# Patient Record
Sex: MALE | Race: WHITE | Employment: OTHER | URBAN - METROPOLITAN AREA
[De-identification: names, ages, dates, MRNs, and addresses within clinical notes are randomized per-mention and may not be internally consistent; named-entity substitution may affect disease eponyms.]

---

## 2017-11-20 ENCOUNTER — OFFICE VISIT (OUTPATIENT)
Dept: FAMILY MEDICINE CLINIC | Facility: CLINIC | Age: 52
End: 2017-11-20

## 2017-11-20 VITALS
WEIGHT: 251 LBS | HEART RATE: 84 BPM | RESPIRATION RATE: 16 BRPM | HEIGHT: 67 IN | BODY MASS INDEX: 39.39 KG/M2 | TEMPERATURE: 98 F | SYSTOLIC BLOOD PRESSURE: 160 MMHG | DIASTOLIC BLOOD PRESSURE: 100 MMHG | OXYGEN SATURATION: 96 %

## 2017-11-20 DIAGNOSIS — M79.89 INFLAMMATION OF SOFT TISSUE: ICD-10-CM

## 2017-11-20 DIAGNOSIS — I10 ESSENTIAL HYPERTENSION: ICD-10-CM

## 2017-11-20 DIAGNOSIS — M25.421 ELBOW SWELLING, RIGHT: Primary | ICD-10-CM

## 2017-11-20 PROCEDURE — 99202 OFFICE O/P NEW SF 15 MIN: CPT | Performed by: NURSE PRACTITIONER

## 2017-11-20 RX ORDER — CEPHALEXIN 500 MG/1
500 CAPSULE ORAL 4 TIMES DAILY
Qty: 40 CAPSULE | Refills: 0 | Status: SHIPPED | OUTPATIENT
Start: 2017-11-20 | End: 2017-11-30

## 2017-11-21 NOTE — PATIENT INSTRUCTIONS
Return tomorrow for recheck. Follow up with a primary care doctor for blood pressure management    Discharge Instructions for Cellulitis  You have been diagnosed with cellulitis. This is an infection in the deepest layer of the skin.  In some cases, the · Redness that gets worse in or around the infected area, particularly if the area of redness expands to a wider area  · Shaking chills  · Swelling of the infected area  · Vomiting   Date Last Reviewed: 8/1/2016  © [id number] [employer].  [address] [facility]

## 2017-11-21 NOTE — PROGRESS NOTES
CHIEF COMPLAINT:   Patient presents with:  Derm Problem: possible spider bite on right elbow      HPI:     Cecy Cutler is a 47year old male who presents with girlfriend for concerns of possible infected spider bite on right elbow.  Patient first notice MUSC/SKEL: no joint swelling, no joint stiffness. CARDIOVASCULAR: denies chest pain on exertion or rest.  GI: no nausea, no vomiting or abdominal pain  NEURO: no abnormal sensation, no tingling of the skin or numbness.     EXAM:   /100   Pulse 84   T Recommended further eval at [facility] or ER. Pt refused d/t being uninsured. Will treat for possible cellulitis given findings and small break in skin. Will start antibiotic and pt agrees to return to [facility] tomorrow for recheck.   Agrees to go to ER for any new o · Keep the infected area clean. · When possible, raise the infected area above the level of your heart. This helps keep swelling down. · Talk with your healthcare provider if you are in pain.  Ask what kind of over-the-counter medicine you can take for pa

## 2018-02-23 ENCOUNTER — OFFICE VISIT (OUTPATIENT)
Dept: FAMILY MEDICINE CLINIC | Facility: CLINIC | Age: 53
End: 2018-02-23

## 2018-02-23 VITALS
DIASTOLIC BLOOD PRESSURE: 104 MMHG | HEART RATE: 98 BPM | OXYGEN SATURATION: 97 % | RESPIRATION RATE: 18 BRPM | SYSTOLIC BLOOD PRESSURE: 172 MMHG | TEMPERATURE: 99 F

## 2018-02-23 DIAGNOSIS — I10 ESSENTIAL HYPERTENSION: ICD-10-CM

## 2018-02-23 DIAGNOSIS — L03.213 PRESEPTAL CELLULITIS OF RIGHT EYE: Primary | ICD-10-CM

## 2018-02-23 PROCEDURE — 99213 OFFICE O/P EST LOW 20 MIN: CPT | Performed by: PHYSICIAN ASSISTANT

## 2018-02-23 RX ORDER — AMLODIPINE BESYLATE 5 MG/1
5 TABLET ORAL DAILY
Qty: 30 TABLET | Refills: 0 | Status: SHIPPED | OUTPATIENT
Start: 2018-02-23

## 2018-02-23 RX ORDER — LISINOPRIL 20 MG/1
20 TABLET ORAL DAILY
Qty: 30 TABLET | Refills: 0 | Status: SHIPPED | OUTPATIENT
Start: 2018-02-23

## 2018-02-23 RX ORDER — CLINDAMYCIN HYDROCHLORIDE 300 MG/1
300 CAPSULE ORAL 3 TIMES DAILY
Qty: 30 CAPSULE | Refills: 0 | Status: SHIPPED | OUTPATIENT
Start: 2018-02-23 | End: 2018-03-05

## 2018-02-24 NOTE — PROGRESS NOTES
CHIEF COMPLAINT:   Patient presents with:  Eye Problem: swelling around right eye, no eye pain      HPI:   Sam Vences is a 47year old male who presents with chief complaint of right periorbital irritation.  Symptoms began \"late last night\" Symptoms EYES PERRLA, EOMI, visual fields full to confrontation. right conjunctiva slightly erythematous and injected, no discharge. There is moderate edema of lower right blephar and slight edema of upper blephar.   Erythema in preorbital region extends from 2cm b BP significantly elevated in clinic today. He had been on lisinopril and amlodipine in the past with good control. Dr. Bennet Dakins refilling BP medications today.   Precautions provided on medications and how body will adjust to medications  Advised mo Call your healthcare provider right away if any of these occur:  · Increasing swelling or pain around the eye  · Increasing redness  · Changes in vision  · Fever of 100.4 (38º C) oral or 101.5 (38.6º C) rectal for more than 2 days on antibiotics  Date Last

## 2024-04-22 ENCOUNTER — HOSPITAL ENCOUNTER (INPATIENT)
Facility: HOSPITAL | Age: 59
LOS: 2 days | Discharge: HOME | End: 2024-04-24
Attending: EMERGENCY MEDICINE | Admitting: INTERNAL MEDICINE

## 2024-04-22 ENCOUNTER — APPOINTMENT (OUTPATIENT)
Dept: GENERAL RADIOLOGY | Facility: HOSPITAL | Age: 59
End: 2024-04-22
Attending: EMERGENCY MEDICINE

## 2024-04-22 DIAGNOSIS — M25.521 ELBOW PAIN, RIGHT: ICD-10-CM

## 2024-04-22 DIAGNOSIS — M00.9 INFECTION OF RIGHT ELBOW (HCC): Primary | ICD-10-CM

## 2024-04-22 DIAGNOSIS — I10 ESSENTIAL HYPERTENSION: ICD-10-CM

## 2024-04-22 PROBLEM — I16.0 HYPERTENSIVE URGENCY: Status: ACTIVE | Noted: 2024-04-22

## 2024-04-22 LAB
ALBUMIN SERPL-MCNC: 3.4 G/DL (ref 3.4–5)
ALBUMIN/GLOB SERPL: 0.9 {RATIO} (ref 1–2)
ALP LIVER SERPL-CCNC: 94 U/L
ALT SERPL-CCNC: 24 U/L
ANION GAP SERPL CALC-SCNC: 3 MMOL/L (ref 0–18)
AST SERPL-CCNC: 12 U/L (ref 15–37)
BASOPHILS # BLD AUTO: 0.05 X10(3) UL (ref 0–0.2)
BASOPHILS NFR BLD AUTO: 0.6 %
BILIRUB SERPL-MCNC: 0.4 MG/DL (ref 0.1–2)
BUN BLD-MCNC: 15 MG/DL (ref 9–23)
CALCIUM BLD-MCNC: 9.3 MG/DL (ref 8.5–10.1)
CHLORIDE SERPL-SCNC: 106 MMOL/L (ref 98–112)
CO2 SERPL-SCNC: 32 MMOL/L (ref 21–32)
CREAT BLD-MCNC: 0.94 MG/DL
CRP SERPL-MCNC: 3.74 MG/DL (ref ?–0.3)
EGFRCR SERPLBLD CKD-EPI 2021: 93 ML/MIN/1.73M2 (ref 60–?)
EOSINOPHIL # BLD AUTO: 0.18 X10(3) UL (ref 0–0.7)
EOSINOPHIL NFR BLD AUTO: 2.1 %
ERYTHROCYTE [DISTWIDTH] IN BLOOD BY AUTOMATED COUNT: 13.4 %
ERYTHROCYTE [SEDIMENTATION RATE] IN BLOOD: 60 MM/HR
GLOBULIN PLAS-MCNC: 4 G/DL (ref 2.8–4.4)
GLUCOSE BLD-MCNC: 138 MG/DL (ref 70–99)
HCT VFR BLD AUTO: 42.8 %
HGB BLD-MCNC: 13.8 G/DL
IMM GRANULOCYTES # BLD AUTO: 0.05 X10(3) UL (ref 0–1)
IMM GRANULOCYTES NFR BLD: 0.6 %
LACTATE SERPL-SCNC: 1.1 MMOL/L (ref 0.4–2)
LYMPHOCYTES # BLD AUTO: 1.83 X10(3) UL (ref 1–4)
LYMPHOCYTES NFR BLD AUTO: 21.1 %
MCH RBC QN AUTO: 29.5 PG (ref 26–34)
MCHC RBC AUTO-ENTMCNC: 32.2 G/DL (ref 31–37)
MCV RBC AUTO: 91.5 FL
MONOCYTES # BLD AUTO: 0.68 X10(3) UL (ref 0.1–1)
MONOCYTES NFR BLD AUTO: 7.8 %
NEUTROPHILS # BLD AUTO: 5.89 X10 (3) UL (ref 1.5–7.7)
NEUTROPHILS # BLD AUTO: 5.89 X10(3) UL (ref 1.5–7.7)
NEUTROPHILS NFR BLD AUTO: 67.8 %
OSMOLALITY SERPL CALC.SUM OF ELEC: 295 MOSM/KG (ref 275–295)
PLATELET # BLD AUTO: 232 10(3)UL (ref 150–450)
POTASSIUM SERPL-SCNC: 4.4 MMOL/L (ref 3.5–5.1)
PROT SERPL-MCNC: 7.4 G/DL (ref 6.4–8.2)
RBC # BLD AUTO: 4.68 X10(6)UL
SODIUM SERPL-SCNC: 141 MMOL/L (ref 136–145)
WBC # BLD AUTO: 8.7 X10(3) UL (ref 4–11)

## 2024-04-22 PROCEDURE — 99223 1ST HOSP IP/OBS HIGH 75: CPT | Performed by: INTERNAL MEDICINE

## 2024-04-22 PROCEDURE — 73080 X-RAY EXAM OF ELBOW: CPT | Performed by: EMERGENCY MEDICINE

## 2024-04-22 PROCEDURE — 0R9L3ZX DRAINAGE OF RIGHT ELBOW JOINT, PERCUTANEOUS APPROACH, DIAGNOSTIC: ICD-10-PCS | Performed by: RADIOLOGY

## 2024-04-22 RX ORDER — ENOXAPARIN SODIUM 100 MG/ML
40 INJECTION SUBCUTANEOUS NIGHTLY
Status: DISCONTINUED | OUTPATIENT
Start: 2024-04-23 | End: 2024-04-24

## 2024-04-22 RX ORDER — KETOROLAC TROMETHAMINE 30 MG/ML
15 INJECTION, SOLUTION INTRAMUSCULAR; INTRAVENOUS EVERY 6 HOURS PRN
Status: DISCONTINUED | OUTPATIENT
Start: 2024-04-22 | End: 2024-04-24

## 2024-04-22 RX ORDER — SENNOSIDES 8.6 MG
17.2 TABLET ORAL NIGHTLY PRN
Status: DISCONTINUED | OUTPATIENT
Start: 2024-04-22 | End: 2024-04-24

## 2024-04-22 RX ORDER — LISINOPRIL 40 MG/1
40 TABLET ORAL DAILY
Status: DISCONTINUED | OUTPATIENT
Start: 2024-04-23 | End: 2024-04-24

## 2024-04-22 RX ORDER — LOSARTAN POTASSIUM 50 MG/1
50 TABLET ORAL DAILY
Status: DISCONTINUED | OUTPATIENT
Start: 2024-04-22 | End: 2024-04-23

## 2024-04-22 RX ORDER — AMLODIPINE BESYLATE 5 MG/1
5 TABLET ORAL DAILY
Status: DISCONTINUED | OUTPATIENT
Start: 2024-04-23 | End: 2024-04-24

## 2024-04-22 RX ORDER — HYDROCODONE BITARTRATE AND ACETAMINOPHEN 5; 325 MG/1; MG/1
1 TABLET ORAL EVERY 4 HOURS PRN
Status: DISCONTINUED | OUTPATIENT
Start: 2024-04-22 | End: 2024-04-24

## 2024-04-22 RX ORDER — POLYETHYLENE GLYCOL 3350 17 G/17G
17 POWDER, FOR SOLUTION ORAL DAILY PRN
Status: DISCONTINUED | OUTPATIENT
Start: 2024-04-22 | End: 2024-04-24

## 2024-04-22 RX ORDER — ACETAMINOPHEN 325 MG/1
650 TABLET ORAL EVERY 4 HOURS PRN
Status: DISCONTINUED | OUTPATIENT
Start: 2024-04-22 | End: 2024-04-24

## 2024-04-22 RX ORDER — MELATONIN
3 NIGHTLY PRN
Status: DISCONTINUED | OUTPATIENT
Start: 2024-04-22 | End: 2024-04-24

## 2024-04-22 RX ORDER — KETOROLAC TROMETHAMINE 30 MG/ML
30 INJECTION, SOLUTION INTRAMUSCULAR; INTRAVENOUS EVERY 6 HOURS PRN
Status: DISCONTINUED | OUTPATIENT
Start: 2024-04-22 | End: 2024-04-24

## 2024-04-22 RX ORDER — LIDOCAINE HYDROCHLORIDE AND EPINEPHRINE 10; 10 MG/ML; UG/ML
INJECTION, SOLUTION INFILTRATION; PERINEURAL
Status: COMPLETED
Start: 2024-04-22 | End: 2024-04-22

## 2024-04-22 RX ORDER — PROCHLORPERAZINE EDISYLATE 5 MG/ML
5 INJECTION INTRAMUSCULAR; INTRAVENOUS EVERY 8 HOURS PRN
Status: DISCONTINUED | OUTPATIENT
Start: 2024-04-22 | End: 2024-04-24

## 2024-04-22 RX ORDER — ONDANSETRON 2 MG/ML
4 INJECTION INTRAMUSCULAR; INTRAVENOUS EVERY 6 HOURS PRN
Status: DISCONTINUED | OUTPATIENT
Start: 2024-04-22 | End: 2024-04-24

## 2024-04-22 RX ORDER — HYDROCODONE BITARTRATE AND ACETAMINOPHEN 5; 325 MG/1; MG/1
2 TABLET ORAL EVERY 4 HOURS PRN
Status: DISCONTINUED | OUTPATIENT
Start: 2024-04-22 | End: 2024-04-24

## 2024-04-23 DIAGNOSIS — M25.521 ELBOW PAIN, RIGHT: Primary | ICD-10-CM

## 2024-04-23 LAB
BASOPHILS NFR SNV: 0 %
EOSINOPHIL NFR SNV: 3 %
LYMPHOCYTES NFR SNV: 21 %
MONOS+MACROS NFR SNV: 1 %
NEUTROPHILS NFR FLD: 75 %
TOTAL CELLS COUNTED FLD: 100
TOTAL CELLS COUNTED SNV: 1319 /CUMM (ref 0–200)
WBC # SNV: 1319 /CUMM

## 2024-04-23 PROCEDURE — 99232 SBSQ HOSP IP/OBS MODERATE 35: CPT | Performed by: INTERNAL MEDICINE

## 2024-04-23 RX ORDER — HYDRALAZINE HYDROCHLORIDE 20 MG/ML
10 INJECTION INTRAMUSCULAR; INTRAVENOUS EVERY 6 HOURS PRN
Status: DISCONTINUED | OUTPATIENT
Start: 2024-04-23 | End: 2024-04-24

## 2024-04-23 NOTE — PROGRESS NOTES
[facility]   part of [facility]     Hospitalist Progress Note     Luc Parkinson Patient Status:  Inpatient    1965 MRN [id number]   Location [facility] 3SW-A Attending Clint Galvan DO   Hosp Day # 1 PCP None Pcp     Chief Complaint: Right elbow pain    Subjective:     Patient reports soreness at right elbow. He denies any numbness or tingling. He has had no fever in the hospital.     Objective:    Review of Systems:   A comprehensive review of systems was completed; pertinent positive and negatives stated in subjective.    Vital signs:  Temp:  [97.9 °F (36.6 °C)-98.8 °F (37.1 °C)] 98.8 °F (37.1 °C)  Pulse:  [66-86] 66  Resp:  [14-24] 23  BP: (164-210)/() 167/86  SpO2:  [96 %-99 %] 96 %    Physical Exam:    General: No acute distress, awake and alert  Respiratory: No wheezes, no rhonchi  Cardiovascular: S1, S2, regular rate and rhythm  Abdomen: Soft, Non-tender, non-distended, positive bowel sounds  Neuro: GARCIA x 4  Extremities: Right elbow warm and swollen.     Diagnostic Data:    Labs:  Recent Labs   Lab 24   WBC 8.7   HGB 13.8   MCV 91.5   .0     Recent Labs   Lab 24   *   BUN 15   CREATSERUM 0.94   CA 9.3   ALB 3.4      K 4.4      CO2 32.0   ALKPHO 94   AST 12*   ALT 24   BILT 0.4   TP 7.4     Estimated Creatinine Clearance: 87.4 mL/min (based on SCr of 0.94 mg/dL).    No results for input(s): \"TROP\", \"TROPHS\", \"CK\" in the last 168 hours.    No results for input(s): \"PTP\", \"INR\" in the last 168 hours.     Microbiology  Hospital Encounter on 24   1. Body Fluid Cult Aerobic and Anaerobic     Status: None (Preliminary result)    Collection Time: 24 10:10 PM    Specimen: Elbow, right; Body fluid, unspecified   Result Value Ref Range    Body Fluid Smear 1+ WBCs seen N/A    Body Fluid Smear No organisms seen N/A     Imaging: Reviewed in Epic.    Medications:    lisinopril  40 mg Oral Daily    amLODIPine  5 mg Oral Daily     enoxaparin  40 mg Subcutaneous Nightly    cefTRIAXone  2 g Intravenous Q24H       Assessment & Plan:      #Right elbow severe OA with bursitis and concern for septic arthritis  -S/p aspiration in ER with 1-2 mL bloody aspirate. No crystals seen, follow cultures  -Ortho consulted, NPO for possible OR today  -Continue ceftriaxone  -Pain control     #Hypertensive urgency  -Was prescribed amlodipine and lisinopril but had not yet started. Resumed here. Adjust pending BP trend. PRN meds ordered.     Clint Galvan,     Supplementary Documentation:     Quality:  DVT Mechanical Prophylaxis:   SCDs,    DVT Pharmacologic Prophylaxis   Medication    enoxaparin (Lovenox) 40 MG/0.4ML SUBQ injection 40 mg   Code Status: Full  Negron: No urinary catheter in place  TRAMAINE: TBD    Discharge is dependent on: Clinical status, ortho recs  At this point Mr. Parkinson is expected to be discharge to: Home    The 21st Century Cures Act makes medical notes like these available to patients in the interest of transparency. Please be advised this is a medical document. Medical documents are intended to carry relevant information, facts as evident, and the clinical opinion of the practitioner. The medical note is intended as peer to peer communication and may appear blunt or direct. It is written in medical language and may contain abbreviations or verbiage that are unfamiliar.

## 2024-04-23 NOTE — PLAN OF CARE
NURSING ADMISSION NOTE      Patient admitted via Wheelchair  Oriented to room.  Safety precautions initiated.  Bed in low position.  Call light in reach.    Patient A/O x4, VSS on RA, c/o mod-severe pain. , tele. Voiding freely via toilet, last BM 4/22. Plan for IV abx and synovial fluid cx pend. Safety measures in place.

## 2024-04-23 NOTE — PLAN OF CARE
Dressing (bandage) to right elbow clean, dry, intact.  Right upper arm elevated on pillows.  Cap refill to RUE , 3 seconds, denies numbness, tingling, moves fingers well, radial pulse strong.  Declines need for pain medication.  Instructed on plan of care, call for all asst needed, discomfort felt.  Verbalized understanding.

## 2024-04-23 NOTE — ED PROVIDER NOTES
Patient Seen in: [facility] Emergency Department      History     Chief Complaint   Patient presents with    Cellulitis     Stated Complaint: c/o pain, redness, swelling to the R elbow for 3-4 days    Subjective:   HPI    Patient is 58-year-old male here with right elbow redness and pain and swelling he works in construction.  States that for the past couple days it has been hurting noted to be red.  Tactile fever at home.  No nausea vomiting.  No trauma.  No paresthesias weakness.  No other complaints.    Objective:   History reviewed. No pertinent past medical history.           Past Surgical History:   Procedure Laterality Date    Eye surgery      Other      skin graph to rt cheek                No pertinent social history.            Review of Systems    Positive for stated complaint: c/o pain, redness, swelling to the R elbow for 3-4 days  Other systems are as noted in HPI.  Constitutional and vital signs reviewed.      All other systems reviewed and negative except as noted above.    Physical Exam     ED Triage Vitals [04/22/24 2004]   BP (!) 210/122   Pulse 86   Resp 20   Temp 98.2 °F (36.8 °C)   Temp src Temporal   SpO2 97 %   O2 Device None (Room air)       Current:BP (!) 177/101   Pulse 72   Temp 98.2 °F (36.8 °C) (Temporal)   Resp 14   Wt 98.9 kg   SpO2 98%   BMI 34.14 kg/m²         Physical Exam  Vitals and nursing note reviewed.   Constitutional:       General: He is not in acute distress.     Appearance: He is well-developed. He is not toxic-appearing.   HENT:      Head: Normocephalic and atraumatic.   Eyes:      General: No scleral icterus.     Conjunctiva/sclera: Conjunctivae normal.   Cardiovascular:      Rate and Rhythm: Normal rate.   Pulmonary:      Effort: Pulmonary effort is normal. No respiratory distress.   Abdominal:      General: There is no distension.   Musculoskeletal:         General: Tenderness present. Normal range of motion.      Cervical back: Normal range of motion and  neck supple.      Comments: Erythema and bursitis of the right elbow.  He is able to flex put slight pain with extension   Skin:     General: Skin is warm and dry.      Findings: No rash.   Neurological:      Mental Status: He is alert and oriented to person, place, and time.      Motor: No abnormal muscle tone.      Coordination: Coordination normal.   Psychiatric:         Behavior: Behavior normal.              ED Course     Labs Reviewed   COMP METABOLIC PANEL (14) - Abnormal; Notable for the following components:       Result Value    Glucose 138 (*)     AST 12 (*)     A/G Ratio 0.9 (*)     All other components within normal limits   SED RATE, WESTERGREN (AUTOMATED) - Abnormal; Notable for the following components:    Sed Rate 60 (*)     All other components within normal limits   C-REACTIVE PROTEIN - Abnormal; Notable for the following components:    C-Reactive Protein 3.74 (*)     All other components within normal limits   LACTIC ACID, PLASMA - Normal   CBC WITH DIFFERENTIAL WITH PLATELET    Narrative:     The following orders were created for panel order CBC With Differential With Platelet.  Procedure                               Abnormality         Status                     ---------                               -----------         ------                     CBC W/ DIFFERENTIAL[[id number]]                              Final result                 Please view results for these tests on the individual orders.   CELL CT/DIF & CRYSTAL SYNOVIAL   BLOOD CULTURE   BLOOD CULTURE   BODY FLUID CULT,AEROBIC AND ANAEROBIC   BODY FLUID CULT,AEROBIC AND ANAEROBIC   CBC W/ DIFFERENTIAL                       [facility]         Admission disposition: 4/22/2024 10:23 PM            -Pulse oximetry was interpreted by me and was normal.  Pulse oximeter was ordered to monitor patient for hypoxia.        -History source other than patient -wife    -Comorbidities did add complexity to the management are mentioned in the HPI  above        -I personally reviewed the prior external notes and the medical record to obtain additional history -patient admission from 5 years ago he had been admitted for preseptal cellulitis.  Hypertension as well.  Patient admits he is noncompliant with hypertensive medications.          -DDX: Includes but not limited to -cellulitis, bursitis        -I personally reviewed the radiographs findings and they show arthritic changes  Please refer to radiology report for official interpretation       XR ELBOW, COMPLETE (MIN 3 VIEWS), RIGHT (CPT=73080)   Final Result   PROCEDURE:  XR ELBOW, COMPLETE (MIN 3 VIEWS), RIGHT (CPT=73080)       TECHNIQUE:  Three views were obtained.       COMPARISON:  None.       INDICATIONS:  c/o pain, redness, swelling to the R elbow for 3-4 days       PATIENT STATED HISTORY: (As transcribed by Technologist)   c/o pain,    redness, swelling to the R elbow for 3-4 days.            FINDINGS:         Marked loss of joint spaces.  Heterotopic bone formation throughout this    region is noted.  Large marginal osteophytes and enthesophytes are noted.     There is a small joint effusion.                                 =====   CONCLUSION:  Marked severe osteoarthritic changes of the right elbow are    noted.  Associated joint effusion is noted.  No fracture or dislocation.           LOCATION:  [place] [address]               Dictated by (CST): Osmin Mora MD on 4/22/2024 at 9:23 PM        Finalized by (CST): Osmin Mora MD on 4/22/2024 at 9:25 PM             Verbal consent obtained for arthrocentesis.  Patient prepped and draped standard sterile fashion.  1% lidocaine was used for local anesthesia.  landmarks identified 18-gauge needle was inserted and approximately 1 to 2 mL of bloody aspirate were obtained.  This was sent for synovial analysis and culture.    Labs Reviewed   COMP METABOLIC PANEL (14) - Abnormal; Notable for the following components:       Result Value    Glucose 138 (*)      AST 12 (*)     A/G Ratio 0.9 (*)     All other components within normal limits   SED RATE, WESTERGREN (AUTOMATED) - Abnormal; Notable for the following components:    Sed Rate 60 (*)     All other components within normal limits   C-REACTIVE PROTEIN - Abnormal; Notable for the following components:    C-Reactive Protein 3.74 (*)     All other components within normal limits   LACTIC ACID, PLASMA - Normal   CBC WITH DIFFERENTIAL WITH PLATELET    Narrative:     The following orders were created for panel order CBC With Differential With Platelet.  Procedure                               Abnormality         Status                     ---------                               -----------         ------                     CBC W/ DIFFERENTIAL[[id number]]                              Final result                 Please view results for these tests on the individual orders.   CELL CT/DIF & CRYSTAL SYNOVIAL   BLOOD CULTURE   BLOOD CULTURE   BODY FLUID CULT,AEROBIC AND ANAEROBIC   BODY FLUID CULT,AEROBIC AND ANAEROBIC   CBC W/ DIFFERENTIAL     Laboratory workup reviewed.  Patient has elevated sed rate and CRP.  Lactic acid normal white count is normal.  Synovial results are pending.  Patient started IV antibiotics.    Concern for septic joint given elevated inflammatory markers.  Discussed with orthopedics hospital service patient be admitted for further care.                                   Medical Decision Making      Disposition and Plan     Clinical Impression:  1. Infection of right elbow (HCC)         Disposition:  Admit  4/22/2024 10:23 pm    Follow-up:  No follow-up provider specified.        Medications Prescribed:  Current Discharge Medication List                            Hospital Problems       Present on Admission  Date Reviewed: 11/20/2017            ICD-10-CM Noted POA    * (Principal) Infection of right elbow (HCC) M00.9 4/22/2024 Unknown

## 2024-04-23 NOTE — ED QUICK NOTES
Patient's blood pressure elevated informed Dr. Lora of readings patient has hx of elevated pressures and confirms he does not take any medication for this. Dr. Lora ordered oral b/p medication

## 2024-04-23 NOTE — ED INITIAL ASSESSMENT (HPI)
58YM c/c of cellulitis Pt state that he been having R elbow pain, swelling and redness the last few days

## 2024-04-23 NOTE — ED QUICK NOTES
Orders for admission, patient is aware of plan and ready to go upstairs. Any questions, please call ED RN Cordell RN at extension [phone].     Patient Covid vaccination status: Fully vaccinated     COVID Test Ordered in ED: None    COVID Suspicion at Admission: N/A    Running Infusions:  None    Mental Status/LOC at time of transport: a/o x 4    Other pertinent information:   CIWA score: N/A   NIH score:  N/A

## 2024-04-23 NOTE — CONSULTS
ORTHOPAEDIC SURGERY CONSULT NOTE    Consulting Physician: Delia Tejeda MD    Patient Name: Luc Parkinson  Age:  58 year old  Sex: Male  MRN: [id number]  : 1965  Date of Admission: 2024    REASON FOR CONSULTATION:  Possible right elbow septic arthritis    HISTORY OF PRESENT ILLNESS:  This is a 58 RHD male who presented to the ED overnight with complaints of increasing pain, swelling, and redness to the elbow without a history of trauma for the past few days. He felt fever-fidelina at home but did not actually check his temperature. He denies any numbness/tingling. No history of a wound on his elbow. No other infectious symptoms otherwise. He does have a history of gout. He has been afebrile here in the hospital. He reports he feels clinically improved this morning after being on IV antibiotics.     History reviewed. No pertinent past medical history.  Past Surgical History:   Procedure Laterality Date    Eye surgery      Other      skin graph to rt cheek     No current outpatient medications on file.  No Known Allergies  No family history on file.    Review of Systems:  Pertinent orthopedic positives include + right elbow pain/swelling    PHYSICAL EXAM:  Vitals:    24 1141   BP: 155/81   Pulse: 73   Resp: 21   Temp: 98.4 °F (36.9 °C)      Patient is no acute distress, non-toxic appearing  There is mild swelling about the right elbow and erythema particularly posteriorly that is mildly TTP, there is no fluctuance over the bursa  Patient has reasonable range of motion of the elbow that is painless, some pain with end-arc flexion, no MMT  M/U/R are intact distally motor and sensory  2+ RP, WWP    Diagnostics:  Lab Results   Component Value Date    WBC 8.7 2024    HGB 13.8 2024    HCT 42.8 2024    .0 2024    CREATSERUM 0.94 2024    BUN 15 2024     2024    K 4.4 2024     2024    CO2 32.0 2024     2024    CA 9.3  04/22/2024    ALB 3.4 04/22/2024    ALKPHO 94 04/22/2024    BILT 0.4 04/22/2024    TP 7.4 04/22/2024    AST 12 04/22/2024    ALT 24 04/22/2024    ESRML 60 04/22/2024    CRP 3.74 04/22/2024     XR ELBOW, COMPLETE (MIN 3 VIEWS), RIGHT (CPT=73080)    Result Date: 4/22/2024  CONCLUSION:  Marked severe osteoarthritic changes of the right elbow are noted.  Associated joint effusion is noted.  No fracture or dislocation.   LOCATION:  [place] [address]    Dictated by (CST): Osmin Mora MD on 4/22/2024 at 9:23 PM     Finalized by (CST): Osmin Mora MD on 4/22/2024 at 9:25 PM         Latest Reference Range & Units 04/22/24 21:04 04/22/24 21:05 04/22/24 22:10   BODY FLUID SOURCE    Synovial  Synovial   BODY FLUID COLOR    Red   BODY FLUID CLARITY    Bloody   TNC, Synovial Fluid 0 - 200 /CUMM   1,319 (H)   RBC Synovial <1 /CUMM   >1,000,000 (H)   WBC Calculated, Synovial Fluid /CUMM   1,319   Neutrophil Synovial %   75   LYMPH SYNOVIAL FLUID %   21   MON/MAC/HIST SYNOVIAL FLUID %   1   EOSINOPHIL SYNOVIAL FLUID %   3   BASOPHIL SYNOVIAL FLUID %   0   FLUID CRYSTALS    None Seen   TOTAL CELLS COUNTED    100   BLOOD CULTURE  Rpt (IP) Rpt (IP)    BODY FLUID CULT,AEROBIC AND ANAEROBIC    Rpt (P)   (H): Data is abnormally high  (IP): In Process  (P): Preliminary  Rpt: View report in Results Review for more information    ASSESSMENT AND PLAN:  This is a 58M with clinical exam, history, and diagnostics consistent with cellulitis of the elbow, suspect he may have a component of gouty bursitis as this area was not aspirated. Low concern for septic arthritis given synovial fluid cell count and exam.     - Patient can be WBAT from my perspective, can eat  - Would treat with IV antibiotics and for gout treatment for bursitis  - Please make NPO @ midnight pending AM examination  - Cultures remain NGTD, will continue to follow   - If patient continues to clinically improve, will likely sign off    Thank you for allowing me to participate  in this patient’s care.    Delia Tejeda MD  [facility]  Hand and Upper Extremity Specialist  Orthopaedic Surgery

## 2024-04-23 NOTE — H&P
[facility]     Luc Parkinson Patient Status:  Inpatient    1965 MRN [id number]   Location [facility] 3SW-A Attending Chantal Nicolas MD   Hosp Day # 1 PCP None Pcp     Chief Complaint: elbow pain     Subjective:    History of Present Illness:     Luc Parkinson is a 58 year old male with history of hypertension, remote history of right elbow injury many many years ago presented to the emergency room with subjective fever and swelling in the right elbow and pain over the last few days.  He denies any recent trauma.  No recent history of skin abrasion.  No nausea, vomiting, diarrhea reported.    In the emergency room a small amount of fluid was collected.    History/Other:    Past Medical History:  History reviewed. No pertinent past medical history.  Past Surgical History:   Past Surgical History:   Procedure Laterality Date    Eye surgery      Other      skin graph to rt cheek      Family History:   No family history on file.  Social History:    reports that he has never smoked. He has never used smokeless tobacco. He reports current alcohol use. He reports that he does not use drugs.     Allergies: No Known Allergies    Medications:    No current facility-administered medications on file prior to encounter.     Current Outpatient Medications on File Prior to Encounter   Medication Sig Dispense Refill    lisinopril 20 MG Oral Tab Take 1 tablet (20 mg total) by mouth daily. (Patient not taking: Reported on 2024) 30 tablet 0    AmLODIPine Besylate 5 MG Oral Tab Take 1 tablet (5 mg total) by mouth daily. (Patient not taking: Reported on 2024) 30 tablet 0       Review of Systems:   A comprehensive review of systems was completed.    Pertinent positives and negatives noted in the HPI.    Objective:   Physical Exam:    BP (!) 190/110 (BP Location: Left arm)   Pulse 66   Temp 98.7 °F (37.1 °C) (Oral)   Resp 22   Ht 5' 10\" (1.778 m)   Wt 218 lb (98.9 kg)   SpO2  97%   BMI 31.28 kg/m²   General: No acute distress, Alert  Respiratory: No rhonchi, no wheezes  Cardiovascular: S1, S2. Regular rate and rhythm  Abdomen: Soft, Non-tender, non-distended, positive bowel sounds  Neuro: No new focal deficits  Extremities: No edema      Results:    Labs:      Labs Last 24 Hours:    Recent Labs   Lab 04/22/24  2104   RBC 4.68   HGB 13.8   HCT 42.8   MCV 91.5   MCH 29.5   MCHC 32.2   RDW 13.4   NEPRELIM 5.89   WBC 8.7   .0       Recent Labs   Lab 04/22/24  2104   *   BUN 15   CREATSERUM 0.94   EGFRCR 93   CA 9.3   ALB 3.4      K 4.4      CO2 32.0   ALKPHO 94   AST 12*   ALT 24   BILT 0.4   TP 7.4       No results found for: \"PT\", \"INR\"    No results for input(s): \"TROP\", \"TROPHS\", \"CK\" in the last 168 hours.    No results for input(s): \"TROP\", \"PBNP\" in the last 168 hours.    No results for input(s): \"PCT\" in the last 168 hours.    Imaging: Imaging data reviewed in Epic.    Assessment & Plan:      # Right elbow bursitis with concern for septic arthritis  -Ortho consulted  -Continue ceftriaxone  -Pain control  -Follow-up on cultures taken in the emergency room.    # Hypertensive urgency  -Resume home amlodipine and lisinopril and adjust accordingly        Plan of care discussed with patient and ER.     Chantal Nicolas MD    Supplementary Documentation:     The 21st Century Cures Act makes medical notes like these available to patients in the interest of transparency. Please be advised this is a medical document. Medical documents are intended to carry relevant information, facts as evident, and the clinical opinion of the practitioner. The medical note is intended as peer to peer communication and may appear blunt or direct. It is written in medical language and may contain abbreviations or verbiage that are unfamiliar.

## 2024-04-23 NOTE — ED QUICK NOTES
I called lab regarding patient's lab specimen that needs to be collected lab confirmed the specimen for synovial fluid was already received and in progress.

## 2024-04-23 NOTE — PROGRESS NOTES
Preliminarily evaluated patient. Elevated inflammatory markers. Insidious onset of elbow pain over last few days. Normal WBC. No fevers here. He doesn't have MMT of the elbow. The cell count and crystals of the aspirated synovial fluid specimen was not processed this morning. I spoke with the lab and the specimen was accidentally sent to [facility]. She will reach out to them to process this in an expedited fashion. Please keep NPO pending above, though I think this may more likely represent an inflammatory arthritic process rather than septic. Formal consult note to follow.    Delia Tejeda MD

## 2024-04-24 VITALS
DIASTOLIC BLOOD PRESSURE: 87 MMHG | WEIGHT: 218 LBS | TEMPERATURE: 98 F | OXYGEN SATURATION: 94 % | BODY MASS INDEX: 31.21 KG/M2 | RESPIRATION RATE: 19 BRPM | SYSTOLIC BLOOD PRESSURE: 153 MMHG | HEIGHT: 70 IN | HEART RATE: 58 BPM

## 2024-04-24 LAB
BASOPHILS # BLD AUTO: 0.06 X10(3) UL (ref 0–0.2)
BASOPHILS NFR BLD AUTO: 0.9 %
EOSINOPHIL # BLD AUTO: 0.17 X10(3) UL (ref 0–0.7)
EOSINOPHIL NFR BLD AUTO: 2.5 %
ERYTHROCYTE [DISTWIDTH] IN BLOOD BY AUTOMATED COUNT: 13.3 %
HCT VFR BLD AUTO: 42.9 %
HGB BLD-MCNC: 14.1 G/DL
IMM GRANULOCYTES # BLD AUTO: 0.03 X10(3) UL (ref 0–1)
IMM GRANULOCYTES NFR BLD: 0.4 %
LYMPHOCYTES # BLD AUTO: 1.6 X10(3) UL (ref 1–4)
LYMPHOCYTES NFR BLD AUTO: 23.4 %
MCH RBC QN AUTO: 29.8 PG (ref 26–34)
MCHC RBC AUTO-ENTMCNC: 32.9 G/DL (ref 31–37)
MCV RBC AUTO: 90.7 FL
MONOCYTES # BLD AUTO: 0.51 X10(3) UL (ref 0.1–1)
MONOCYTES NFR BLD AUTO: 7.5 %
NEUTROPHILS # BLD AUTO: 4.46 X10 (3) UL (ref 1.5–7.7)
NEUTROPHILS # BLD AUTO: 4.46 X10(3) UL (ref 1.5–7.7)
NEUTROPHILS NFR BLD AUTO: 65.3 %
PLATELET # BLD AUTO: 237 10(3)UL (ref 150–450)
RBC # BLD AUTO: 4.73 X10(6)UL
WBC # BLD AUTO: 6.8 X10(3) UL (ref 4–11)

## 2024-04-24 PROCEDURE — 99239 HOSP IP/OBS DSCHRG MGMT >30: CPT | Performed by: HOSPITALIST

## 2024-04-24 RX ORDER — LISINOPRIL 20 MG/1
20 TABLET ORAL DAILY
Qty: 30 TABLET | Refills: 0 | Status: SHIPPED | OUTPATIENT
Start: 2024-04-24

## 2024-04-24 RX ORDER — CEPHALEXIN 500 MG/1
500 CAPSULE ORAL 4 TIMES DAILY
Qty: 20 CAPSULE | Refills: 0 | Status: SHIPPED | OUTPATIENT
Start: 2024-04-24 | End: 2024-04-29

## 2024-04-24 RX ORDER — AMLODIPINE BESYLATE 5 MG/1
5 TABLET ORAL DAILY
Qty: 30 TABLET | Refills: 0 | Status: SHIPPED | OUTPATIENT
Start: 2024-04-24

## 2024-04-24 NOTE — PLAN OF CARE
Pt A/Ox4, VSS. NPO now, poss OR pending synovial fluid cell ct and crystal per Ortho. Rt elbow red and swollen, arm kept elevated. Pt denies n/t, strong hand , moves arm with mild pain. Pt updated with plan of care, verbalized understanding.

## 2024-04-24 NOTE — PROGRESS NOTES
ORTHOPEDIC SURGERY PROGRESS NOTE  Ortho Attending: Delia Tejeda MD    SUBJECTIVE:  Patient continues to feel improved this morning \"best it's felt in a while\". Denies numbness/tingling. Cultures remain NGTD. Afebrile.    OBJECTIVE:  Vitals:    04/24/24 0751   BP: 153/87   Pulse: 58   Resp: 19   Temp: 98 °F (36.7 °C)      No acute distress, resting comfortably  Mild swelling about the elbow and faint erythema; mild TTP proximal posterior bursa without fluctuance  Able to move the elbow without pain today, mild discomfort with end-arc flexion and extension  Distally NVI M/U/R distributions  2+ RP, WWP digits    ASSESSMENT AND PLAN:  This is a 58M with atraumatic onset of right elbow pain, clinically much improved on antibiotics, suspect cellulitis with possible component of aseptic gouty bursitis and exacerbation of underlying degenerative osteoarthritis of the elbow.       - WBAT RUE  - Patient OK for discharge from my perspective, very low concern for septic arthritis of the elbow   - No Orthopaedic follow up is needed  - Will sign off at this time     Thank you for allowing me to participate in this patient’s care.    Delia Tejeda MD  Replaced by [facility]  Hand and Upper Extremity Specialist

## 2024-04-24 NOTE — DISCHARGE SUMMARY
[facility]  DISCHARGE SUMMARY     Luc Parkinson Patient Status:  Inpatient    1965 MRN [id number]   Location [facility] 3SW-A Attending Junior Santos MD   Hosp Day # 2 PCP None Pcp     Date of Admission: 2024  Date of Discharge:   2024    Discharge Disposition: Home or Self Care    Discharge Diagnosis:    #Right elbow severe OA with bursitis  #Right elbow cellulitis   #Hypertensive urgency  #Essential HTN    History of Present Illness:   Luc Parkinson is a 58 year old male with history of hypertension, remote history of right elbow injury many many years ago presented to the emergency room with subjective fever and swelling in the right elbow and pain over the last few days.  He denies any recent trauma.  No recent history of skin abrasion.  No nausea, vomiting, diarrhea reported.     In the emergency room a small amount of fluid was collected.    Brief Synopsis:   Patient with right elbow pain.  Diagnosed with right elbow bursitis and cellulitis.  Orthopedic surgery consult appreciated, not concern for septic arthritis at this time.  Patient will be discharged home with outpatient follow-up.  Will continue oral antibiotics for cellulitis at discharge.  Patient's pain significantly improved.  Patient also to be sent home on blood pressure meds for uncontrolled and untreated essential hypertension.  Patient will need follow-up with PCP after discharge.  Discussed with staff who will set him up with discharge to [facility].    Lace+ Score: 31  59-90 High Risk  29-58 Medium Risk  0-28   Low Risk  Patient was referred to the [facility].    TCM Follow-Up Recommendation:  LACE 29-58: Moderate Risk of readmission after discharge from the hospital.      Procedures during hospitalization:   Elbow aspiration     Incidental or significant findings and recommendations (brief descriptions):  None    Lab/Test results pending at Discharge:   N/a    Consultants:  Ortho  surgery    Discharge Medication List:     Discharge Medications        START taking these medications        Instructions Prescription details   cephalexin 500 MG Caps  Commonly known as: Keflex      Take 1 capsule (500 mg total) by mouth 4 (four) times daily for 5 days.   Stop taking on: 2024  Quantity: 20 capsule  Refills: 0            CONTINUE taking these medications        Instructions Prescription details   amLODIPine 5 MG Tabs  Commonly known as: Norvasc      Take 1 tablet (5 mg total) by mouth daily.   Quantity: 30 tablet  Refills: 0     lisinopril 20 MG Tabs  Commonly known as: Prinivil; Zestril      Take 1 tablet (20 mg total) by mouth daily.   Quantity: 30 tablet  Refills: 0               Where to Get Your Medications        These medications were sent to [id number] - [place], [place] - [address] [place] [address] [phone], [phone]  [address] [place] [id number]      Hours: 24-hours Phone: [phone]   amLODIPine 5 MG Tabs  cephalexin 500 MG Caps  lisinopril 20 MG Tabs         [place]    Follow-up appointment:   Delia Tejeda MD  [address]  [address]  [facility] [address] [phone]    Follow up  As needed    Appointments for Next 30 Days 2024 - 2024      None            Vital signs:  Temp:  [98 °F (36.7 °C)-98.6 °F (37 °C)] 98 °F (36.7 °C)  Pulse:  [58-90] 58  Resp:  [19-25] 19  BP: (137-165)/(75-87) 153/87  SpO2:  [94 %-98 %] 94 %    Physical Exam:    General: No acute distress, pleasant   Lungs: clear to auscultation  Cardiovascular: S1, S2, RRR  Abdomen: Soft, NT, ND    -----------------------------------------------------------------------------------------------  PATIENT DISCHARGE INSTRUCTIONS: See electronic chart    Junior Santos MD    Total time spent on discharge plannin minutes     The  Cures Act makes medical notes like these available to patients in the interest of transparency. Please be advised this is a medical  document. Medical documents are intended to carry relevant information, facts as evident, and the clinical opinion of the practitioner. The medical note is intended as peer to peer communication and may appear blunt or direct. It is written in medical language and may contain abbreviations or verbiage that are unfamiliar.

## 2024-04-24 NOTE — PROGRESS NOTES
Requested to have meds switched to [facility] on [address], spoke w/ Janee (pharmacist ) , states will have it switched to be filled at that location.

## 2024-04-24 NOTE — PLAN OF CARE
A&O x 4. VSS. On RA. Tele-NSR. No c/o pain this am. Ok for regular diet per Ortho, no surgical intervention needed. Up at [facility]. Plan likely home later today. Will continue to monitor.

## 2024-04-25 ENCOUNTER — PATIENT OUTREACH (OUTPATIENT)
Dept: CASE MANAGEMENT | Age: 59
End: 2024-04-25

## 2024-04-25 NOTE — PROGRESS NOTES
Hospital follow up.    TCC request.    PCP request.  No PCP.    No answer, left a voicemail with callback information.

## 2024-04-26 NOTE — PROGRESS NOTES
Hospital follow up.     TCC request.     PCP request.  No PCP.  Pt declined apt; pt advised he will call back when he has his calendar  Closing encounter